# Patient Record
Sex: MALE | Race: OTHER
[De-identification: names, ages, dates, MRNs, and addresses within clinical notes are randomized per-mention and may not be internally consistent; named-entity substitution may affect disease eponyms.]

---

## 2019-07-27 ENCOUNTER — HOSPITAL ENCOUNTER (EMERGENCY)
Dept: HOSPITAL 53 - M ED | Age: 19
LOS: 1 days | Discharge: PSYCHIATRIC OR SUBSTANCE USE FACILITY | End: 2019-07-28
Payer: SELF-PAY
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
## 2019-07-27 DIAGNOSIS — S70.311A: ICD-10-CM

## 2019-07-27 DIAGNOSIS — R45.851: ICD-10-CM
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
## 2019-07-27 DIAGNOSIS — F33.9: Primary | ICD-10-CM

## 2019-07-27 LAB
ALBUMIN SERPL BCG-MCNC: 4.3 GM/DL (ref 3.2–5.2)
ALT SERPL W P-5'-P-CCNC: 23 U/L (ref 12–78)
AMPHETAMINES UR QL SCN: NEGATIVE
APAP SERPL-MCNC: < 2 UG/ML (ref 10–30)
BARBITURATES UR QL SCN: NEGATIVE
BENZODIAZ UR QL SCN: NEGATIVE
BILIRUB CONJ SERPL-MCNC: 0.3 MG/DL (ref 0–0.2)
BILIRUB SERPL-MCNC: 1 MG/DL (ref 0.2–1)
BUN SERPL-MCNC: 10 MG/DL (ref 7–18)
BZE UR QL SCN: NEGATIVE
CALCIUM SERPL-MCNC: 9 MG/DL (ref 8.5–10.1)
CANNABINOIDS UR QL SCN: NEGATIVE
CHLORIDE SERPL-SCNC: 105 MEQ/L (ref 98–107)
CO2 SERPL-SCNC: 30 MEQ/L (ref 21–32)
CREAT SERPL-MCNC: 1.2 MG/DL (ref 0.7–1.3)
ETHANOL SERPL-MCNC: < 0.003 % (ref 0–0.01)
GLUCOSE SERPL-MCNC: 111 MG/DL (ref 70–100)
HCT VFR BLD AUTO: 45.8 % (ref 42–52)
HGB BLD-MCNC: 15.5 G/DL (ref 13.5–17.5)
MCH RBC QN AUTO: 30.3 PG (ref 27–33)
MCHC RBC AUTO-ENTMCNC: 33.8 G/DL (ref 32–36.5)
MCV RBC AUTO: 89.5 FL (ref 80–96)
METHADONE UR QL SCN: NEGATIVE
OPIATES UR QL SCN: NEGATIVE
PCP UR QL SCN: NEGATIVE
PLATELET # BLD AUTO: 211 10^3/UL (ref 150–450)
POTASSIUM SERPL-SCNC: 4.1 MEQ/L (ref 3.5–5.1)
PROT SERPL-MCNC: 8 GM/DL (ref 6.4–8.2)
RBC # BLD AUTO: 5.12 10^6/UL (ref 4.3–6.1)
SALICYLATES SERPL-MCNC: < 1.7 MG/DL (ref 5–30)
SODIUM SERPL-SCNC: 140 MEQ/L (ref 136–145)
TSH SERPL DL<=0.005 MIU/L-ACNC: 3.79 UIU/ML (ref 0.46–3.98)
WBC # BLD AUTO: 6 10^3/UL (ref 4–10)

## 2019-07-27 PROCEDURE — 84443 ASSAY THYROID STIM HORMONE: CPT

## 2019-07-27 PROCEDURE — 36415 COLL VENOUS BLD VENIPUNCTURE: CPT

## 2019-07-27 PROCEDURE — 80048 BASIC METABOLIC PNL TOTAL CA: CPT

## 2019-07-27 PROCEDURE — 80076 HEPATIC FUNCTION PANEL: CPT

## 2019-07-27 PROCEDURE — 85027 COMPLETE CBC AUTOMATED: CPT

## 2019-07-27 PROCEDURE — 93005 ELECTROCARDIOGRAM TRACING: CPT

## 2019-07-27 PROCEDURE — 90715 TDAP VACCINE 7 YRS/> IM: CPT

## 2019-07-27 PROCEDURE — 80307 DRUG TEST PRSMV CHEM ANLYZR: CPT

## 2019-07-27 PROCEDURE — 99284 EMERGENCY DEPT VISIT MOD MDM: CPT

## 2019-07-27 PROCEDURE — 90471 IMMUNIZATION ADMIN: CPT

## 2019-07-28 VITALS — SYSTOLIC BLOOD PRESSURE: 105 MMHG | DIASTOLIC BLOOD PRESSURE: 64 MMHG

## 2019-07-28 NOTE — ECGEPIP
[facility] - ED

                                       

                                       Test Date:    2019

Pat Name:     VIJAYA MCKEON            Department:   

Patient ID:   [id number]                 Room:         -

Gender:       Male                     Technician:   RENATO

:          2000               Requested By: MACY RODRIGUEZ

Order Number: [id number]     Reading MD:   Greta Zayas

                                 Measurements

Intervals                              Axis          

Rate:         64                       P:            64

CT:           128                      QRS:          91

QRSD:         100                      T:            67

QT:           379                                    

QTc:          394                                    

                           Interpretive Statements

SINUS RHYTHM WITH SINUS ARRHYTHMIA

BORDERLINE RIGHT AXIS DEVIATION

EARLY REPOLARIZATION, CLINICAL CORRELATION TO EXCLUDE ISCHMIA

Electronically Signed on 2019 21:21:22 EDT by Greta Zayas

## 2019-12-10 ENCOUNTER — HOSPITAL ENCOUNTER (OUTPATIENT)
Dept: HOSPITAL 53 - M LRY | Age: 19
End: 2019-12-10
Attending: PHYSICIAN ASSISTANT
Payer: SELF-PAY

## 2019-12-10 DIAGNOSIS — R50.9: Primary | ICD-10-CM

## 2019-12-10 NOTE — REP
Chest x-ray:  Two views.

 

History: Fever.  .

 

Comparison study: No comparison study .

 

Findings:  The lungs are well inflated.  There is a subtle infiltrate in the

right lower lobe consistent with pneumonia.  Remaining lung fields are clear.

Heart is not enlarged.  No bony abnormality is seen.

 

Impression:

 

Right lower lobe pneumonia.

 

 

Electronically Signed by

Juma Damon MD 12/10/2019 02:07 P